# Patient Record
(demographics unavailable — no encounter records)

---

## 2025-01-31 NOTE — PLAN
[FreeTextEntry1] : HTN- patient's BP well controlled with current medication. will continue current  regimen   Hypothyroidism- patient will follow with Endocrinologist  Regarding patient's obesity - encourage lifestyle modifications - diet and exercise - reduce calorie intake - no soda/ junk food/ snacks - consider mixed grains/ whole grains, leafy vegetables, and an appropriate serving of protein   Prior to appointment and during encounter with patient extensive medical records were reviewed including but not limited to, Hospital records, out patient records, laboratory data and microbiology data    Total encounter total time 30 mins >50% of time spent counseling/coordinating care  Counseling included abnormal lab results, differential diagnoses, treatment options, risks and benefits, lifestyle changes, current condition, medications, and dose adjustments.  The patient was interactive, attentive, asked questions, and verbalized understanding

## 2025-01-31 NOTE — HISTORY OF PRESENT ILLNESS
[FreeTextEntry1] : follow up chronic medical conditions  [de-identified] : Ms. NICOL HAMEED is a 77 year female with a PMH of Hypothyroidism, HLD, HTN, comes to the office for follow up chronic medical conditions.  Patient denies fever, cough SOB. No other complaints at this time.

## 2025-07-25 NOTE — HISTORY OF PRESENT ILLNESS
[FreeTextEntry1] : follow up chronic medical conditions  [de-identified] : Ms. NICOL HAMEED is a 78 year female with a PMH of Hypothyroidism, HLD, HTN, comes to the office for follow up chronic medical conditions.  Patient denies fever, cough SOB. No other complaints at this time.